# Patient Record
Sex: FEMALE | Race: WHITE | NOT HISPANIC OR LATINO | URBAN - METROPOLITAN AREA
[De-identification: names, ages, dates, MRNs, and addresses within clinical notes are randomized per-mention and may not be internally consistent; named-entity substitution may affect disease eponyms.]

---

## 2019-10-12 ENCOUNTER — OFFICE VISIT - RIVER FALLS (OUTPATIENT)
Dept: FAMILY MEDICINE | Facility: CLINIC | Age: 19
End: 2019-10-12

## 2019-10-12 LAB — DEPRECATED S PYO AG THROAT QL EIA: DETECTED

## 2019-10-12 ASSESSMENT — MIFFLIN-ST. JEOR: SCORE: 1508.41

## 2019-10-13 ENCOUNTER — OFFICE VISIT - RIVER FALLS (OUTPATIENT)
Dept: FAMILY MEDICINE | Facility: CLINIC | Age: 19
End: 2019-10-13

## 2022-02-11 VITALS
HEART RATE: 101 BPM | SYSTOLIC BLOOD PRESSURE: 114 MMHG | OXYGEN SATURATION: 98 % | DIASTOLIC BLOOD PRESSURE: 74 MMHG | DIASTOLIC BLOOD PRESSURE: 72 MMHG | HEART RATE: 117 BPM | OXYGEN SATURATION: 98 % | TEMPERATURE: 99.4 F | BODY MASS INDEX: 24.77 KG/M2 | WEIGHT: 157.8 LBS | SYSTOLIC BLOOD PRESSURE: 120 MMHG | HEIGHT: 67 IN | TEMPERATURE: 99.8 F

## 2022-02-16 NOTE — PROGRESS NOTES
Chief Complaint    having a hard time swallowing, hard to talk, dx with strep yesterday and is feeling worse, fever  History of Present Illness      Chief complaint as above reviewed and confirmed with patient.  Pt presents to the clinic with concerns re: sore throat. Seen yesterday for strep throat.  Given RX for zithromax given allergy to Amoxicillin.  Took 2 doses, worsening rather than improving.  Now today with difficulty talking, difficulty swallowing saliva and difficulty opening mouth.  Review of Systems      Review of systems is negative with the exception of those noted in HPI          Physical Exam   Vitals & Measurements    T: 99.8   F (Tympanic)  HR: 117(Peripheral)  BP: 114/72  SpO2: 98%            pt appears uncomfortable, able to manage secretions currently.       Able to open mouth only about 2 cm, moderate trismus.       Able to visualize R side moderate swelling, with cellulitis extending to the soft palate, deviation of the uvula to the L      neck supple but moderate anterior cervical adenopathy that is TTP.  No posterior adenopathy.          Assessment/Plan       1. Peritonsillar abscess (J36)         recommend ED evaluation and management as she will need consideration of I and D, IV abx and close observation, possible CT scan.  Discussed with Pt mom as she is from [place] and has [employer] with preferred tx at [facility].  Mom will take pt to ED in [place] via private vehicle, vitals in stable, pt may take sips of water but no other PO.  Discussed with ED at [facility] and they are expecting her.       Orders:         azithromycin, Take 2 tablets on Day 1 and then 1 tablet, Oral, daily, Instructions: until finished, # 6 tab(s), 0 Refill(s), Type: Acute, Pharmacy: [facility], Take 2 tablets on Day 1 and then 1 tablet Oral daily,Instr:until finished, (Ordered)         [address] office outpatient visit 15 minutes (Charge), Quantity: 1, Sore throat          POC, GROUP A STREP* ([facility]), Specimen Type: Swab, Collection Date: 10/12/19 11:11:00 CDT  Patient Information     Name:JERE COKER      Address:      [address] [place] [id number]     Sex:Female     YOB: 2000     [phone]     Emergency Contact:KEITH COKER     MRN:[id number]     FIN:[id number]     Location:[facility]     Date of Service:10/13/2019      Primary Care Physician:       NONE ,       Attending Physician:       Ashli Welsh PA-C, [phone]  Problem List/Past Medical History    Ongoing     No qualifying data    Historical     No qualifying data  Medications    sertraline 50 mg oral tablet, 50 mg= 1 tab(s), Oral, daily    Zithromax Z-Shant 250 mg oral tablet, Take 2 tablets on Day 1 and then 1 tablet, Oral, daily  Allergies    amoxicillin  Social History    Smoking Status - 10/13/2019     Never smoker  Lab Results       Lab Results (Last 4 results within 90 days)        Group A Strep POC: DETECTED Abnormal (10/12/19 11:22:00)

## 2022-02-16 NOTE — PROGRESS NOTES
Chief Complaint    c/o sore throat, headache, and body aches x2 days  History of Present Illness      Chief complaint as above reviewed and confirmed with patient.  Pt presents to the clinic with concerns re: ST, fever x 2 days, worsening.  No uri sx. No nausea, vomiting.  Tolerating po well for fluids, solids painful.  No rash. Some stomach upset and HA.  Review of Systems      Review of systems is negative with the exception of those noted in HPI          Physical Exam   Vitals & Measurements    T: 99.4   F (Tympanic)  HR: 101(Peripheral)  BP: 120/74  SpO2: 98%     HT: 67 in  WT: 157.8 lb  BMI: 24.71           Vitals as above per nursing documentation           Constitutional : nad appears well          Ears: ears patent B, TMS intact, noninjected           Nose: nasal mucosa is non-ededmatous. no discharge           Throat: pharynx is erythematous, 2+ tonsillar hypertrophy, with exudate           Neck: neck supple, tender anterior cervical adenopathy, no thyromegaly, no rigidity           Lungs: lungs CTA', no Wheezes, rhonchi or rales           Heart: heart RRR, nl S1, S2 no murmur           skin:  No rashes            strep test: detected   Assessment/Plan       Sore throat (J02.9)       Orders:         azithromycin, Take 2 tablets on Day 1 and then 1 tablet, Oral, daily, Instructions: until finished, # 6 tab(s), 0 Refill(s), Type: Acute, Pharmacy: [facility], Take 2 tablets on Day 1 and then 1 tablet Oral daily,Instr:until finished, (Ordered)         POC, GROUP A STREP* ([facility]), Specimen Type: Swab, Collection Date: 10/12/19 11:11:00 CDT  Patient Information     Name:JERE COKER      Address:      [address] [place] [id number]     Sex:Female     YOB: 2000     [phone]     Emergency Contact:KEITH COKER     MRN:[id number]     FIN:[id number]     Location:[facility]     Date of Service:10/12/2019      Primary Care Physician:        NONE ,       Attending Physician:       Blaise MOROCHO, Ashli ZUÑIGA, [phone]  Problem List/Past Medical History    Ongoing     No qualifying data    Historical     No qualifying data  Medications    sertraline 50 mg oral tablet, 50 mg= 1 tab(s), Oral, daily    Zithromax Z-Shant 250 mg oral tablet, Take 2 tablets on Day 1 and then 1 tablet, Oral, daily  Allergies    amoxicillin  Social History    Smoking Status - 10/12/2019     Never smoker  Lab Results       Lab Results (Last 4 results within 90 days)        Group A Strep POC: DETECTED Abnormal (10/12/19 11:22:00)

## 2022-02-16 NOTE — NURSING NOTE
Comprehensive Intake Entered On:  10/12/2019 11:28 AM CDT    Performed On:  10/12/2019 11:23 AM CDT by Marcela Miles               Summary   Chief Complaint :   c/o sore throat, headache, and body aches x2 days   Weight Measured :   157.8 lb(Converted to: 157 lb 13 oz, 71.58 kg)    Height Measured :   67 in(Converted to: 5 ft 7 in, 170.18 cm)    Body Mass Index :   24.71 kg/m2   Body Surface Area :   1.84 m2   Systolic Blood Pressure :   120 mmHg   Diastolic Blood Pressure :   74 mmHg   Mean Arterial Pressure :   89 mmHg   Peripheral Pulse Rate :   101 bpm (HI)    BP Site :   Right arm   Pulse Site :   Radial artery   BP Method :   Manual   HR Method :   Electronic   Temperature Tympanic :   99.4 DegF(Converted to: 37.4 DegC)    Oxygen Saturation :   98 %   Marcela Miles - 10/12/2019 11:23 AM CDT   Health Status   Allergies Verified? :   Yes   Medication History Verified? :   Yes   Medical History Verified? :   Yes   Pre-Visit Planning Status :   Completed   Tobacco Use? :   Never smoker   Marcela Miles - 10/12/2019 11:23 AM CDT   Consents   Consent for Immunization Exchange :   Consent Granted   Consent for Immunizations to Providers :   Consent Granted   Marcela Miles - 10/12/2019 11:23 AM CDT   Meds / Allergies   (As Of: 10/12/2019 11:28:24 AM CDT)   Allergies (Active)   amoxicillin  Estimated Onset Date:   Unspecified ; Created By:   Marcela Miles; Reaction Status:   Active ; Category:   Drug ; Substance:   amoxicillin ; Type:   Allergy ; Updated By:   Marcela Miles; Reviewed Date:   10/12/2019 11:27 AM CDT        Medication List   (As Of: 10/12/2019 11:28:24 AM CDT)   Home Meds    sertraline  :   sertraline ; Status:   Documented ; Ordered As Mnemonic:   sertraline 50 mg oral tablet ; Simple Display Line:   50 mg, 1 tab(s), Oral, daily, 90 tab(s), 0 Refill(s) ; Catalog Code:   sertraline ; Order Dt/Tm:   10/12/2019 11:28:01 AM CDT

## 2022-02-16 NOTE — NURSING NOTE
Comprehensive Intake Entered On:  10/13/2019 4:29 PM CDT    Performed On:  10/13/2019 4:26 PM CDT by Ashley Matt LPN               Summary   Chief Complaint :   having a hard time swallowing, hard to talk, dx with strep yesterday and is feeling worse, fever   Systolic Blood Pressure :   114 mmHg   Diastolic Blood Pressure :   72 mmHg   Mean Arterial Pressure :   86 mmHg   Peripheral Pulse Rate :   117 bpm (HI)    BP Site :   Right arm   BP Method :   Manual   Temperature Tympanic :   99.8 DegF(Converted to: 37.7 DegC)    Oxygen Saturation :   98 %   Ashley Matt LPN - 10/13/2019 4:26 PM CDT   Health Status   Allergies Verified? :   Yes   Medication History Verified? :   Yes   Medical History Verified? :   No   Pre-Visit Planning Status :   N/A   Tobacco Use? :   Never smoker   Ashley Matt LPN - 10/13/2019 4:26 PM CDT   Meds / Allergies   (As Of: 10/13/2019 4:29:47 PM CDT)   Allergies (Active)   amoxicillin  Estimated Onset Date:   Unspecified ; Created By:   Marcela Miles; Reaction Status:   Active ; Category:   Drug ; Substance:   amoxicillin ; Type:   Allergy ; Updated By:   Marcela Miles; Reviewed Date:   10/12/2019 11:27 AM CDT        Medication List   (As Of: 10/13/2019 4:29:47 PM CDT)   Prescription/Discharge Order    azithromycin  :   azithromycin ; Status:   Prescribed ; Ordered As Mnemonic:   Zithromax Z-Shant 250 mg oral tablet ; Simple Display Line:   Take 2 tablets on Day 1 and then 1 tablet, Oral, daily, until finished, 6 tab(s), 0 Refill(s) ; Ordering Provider:   Ashli Welsh PA-C; Catalog Code:   azithromycin ; Order Dt/Tm:   10/12/2019 11:41:24 AM CDT            Home Meds    sertraline  :   sertraline ; Status:   Documented ; Ordered As Mnemonic:   sertraline 50 mg oral tablet ; Simple Display Line:   50 mg, 1 tab(s), Oral, daily, 90 tab(s), 0 Refill(s) ; Catalog Code:   sertraline ; Order Dt/Tm:   10/12/2019 11:28:01 AM CDT